# Patient Record
Sex: FEMALE | Race: WHITE
[De-identification: names, ages, dates, MRNs, and addresses within clinical notes are randomized per-mention and may not be internally consistent; named-entity substitution may affect disease eponyms.]

---

## 2019-11-27 ENCOUNTER — TRANSCRIPTION ENCOUNTER (OUTPATIENT)
Age: 74
End: 2019-11-27

## 2021-04-09 ENCOUNTER — OUTPATIENT (OUTPATIENT)
Dept: OUTPATIENT SERVICES | Facility: HOSPITAL | Age: 76
LOS: 1 days | End: 2021-04-09
Payer: MEDICARE

## 2021-04-09 ENCOUNTER — APPOINTMENT (OUTPATIENT)
Dept: CV DIAGNOSITCS | Facility: HOSPITAL | Age: 76
End: 2021-04-09

## 2021-04-09 DIAGNOSIS — R07.9 CHEST PAIN, UNSPECIFIED: ICD-10-CM

## 2021-04-09 PROCEDURE — 93306 TTE W/DOPPLER COMPLETE: CPT | Mod: 26

## 2022-05-06 ENCOUNTER — APPOINTMENT (OUTPATIENT)
Dept: ORTHOPEDIC SURGERY | Facility: CLINIC | Age: 77
End: 2022-05-06
Payer: MEDICARE

## 2022-05-06 VITALS — WEIGHT: 150 LBS | BODY MASS INDEX: 25.61 KG/M2 | HEIGHT: 64 IN

## 2022-05-06 DIAGNOSIS — E78.00 PURE HYPERCHOLESTEROLEMIA, UNSPECIFIED: ICD-10-CM

## 2022-05-06 DIAGNOSIS — M25.539 PAIN IN UNSPECIFIED WRIST: ICD-10-CM

## 2022-05-06 DIAGNOSIS — I10 ESSENTIAL (PRIMARY) HYPERTENSION: ICD-10-CM

## 2022-05-06 PROCEDURE — 73100 X-RAY EXAM OF WRIST: CPT | Mod: RT

## 2022-05-06 PROCEDURE — L3908: CPT

## 2022-05-06 PROCEDURE — 73562 X-RAY EXAM OF KNEE 3: CPT | Mod: RT

## 2022-05-06 PROCEDURE — 99214 OFFICE O/P EST MOD 30 MIN: CPT

## 2022-05-06 NOTE — IMAGING
[Left] : left wrist [There are no fractures, subluxations or dislocations. No significant abnormalities are seen] : There are no fractures, subluxations or dislocations. No significant abnormalities are seen [Right] : right knee [AP] : anteroposterior [Lateral] : lateral [[place]] : [place] [Components well fixed, in good position] : Components well fixed, in good position [de-identified] : right knee: large anterior hematoma, not IA, abrasion on skin, NVI, only tender at hematoma, 0-120, ligaments stable, full active quad function \par left wrist: tenderness distal radius, NVI, ecchymosis in hand, full motion and function of all digits

## 2022-05-06 NOTE — ASSESSMENT
[FreeTextEntry1] : Mild OA both hips but no significant exam findings. Right TKA 2014 always with pain but good exam and XRs. Left knee medial pain no sig XR findings. PT and NSAIDs and MRI left knee r/o MMT.\par 1/29/19: MRI showing mild OA with LMT. Lateral pain today. Discussed options - she would like to continue PT for now and possible cortisone inj or knee scope if no improvement in the future.\par 2/26 we will try inj today - cont with PT - she understands that still will have the tear and pain may retrun may still need arthroscopy\par 4/2/19: Left knee doing well - hold on scope. Right TKA cont pain and states it is exactly like before surgery but has good function other than some mild crepitus. No hip pain on exam. Discussed lidocaine challenge or Lspine workup but she would like to hold off on any workup at this point.\par 8/20 she has cont pain in the left knee with LMT and OA but I am unsure if scope will help her nasd TKA on rt she is not happy with - we will try another inj today\par 2/4/20: Recurrence of left knee pain. No XR progression - has right TKA that she is still unhappy with - repeat inj left knee today and offered left knee arthroscopy if pain persists.\par 8/31/20: Repeat inj left knee today - discussed possible future left knee scope if pain returns.\par 4/30/21: No change in left knee XR. Has lateral symptoms - MRI 2019 with large LMT. Right TKA still with pain but feels very stable. Left knee inj today, used zilretta.\par 10/29/21: Return of left knee pain - repeat zilretta inj left knee today.\par \par 5/6/22: patient had an acute fall 2 weeks ago and has significant bruising on left wrist and right knee. No abnormalities seen on XR. Wrist treated with cockup wrist splint and advised patient to use ice and warm compresses on knee.

## 2022-05-11 ENCOUNTER — APPOINTMENT (OUTPATIENT)
Dept: ORTHOPEDIC SURGERY | Facility: CLINIC | Age: 77
End: 2022-05-11

## 2022-06-14 ENCOUNTER — APPOINTMENT (OUTPATIENT)
Dept: ORTHOPEDIC SURGERY | Facility: CLINIC | Age: 77
End: 2022-06-14
Payer: MEDICARE

## 2022-06-14 VITALS — WEIGHT: 150 LBS | BODY MASS INDEX: 25.61 KG/M2 | HEIGHT: 64 IN

## 2022-06-14 DIAGNOSIS — S83.242A OTHER TEAR OF MEDIAL MENISCUS, CURRENT INJURY, LEFT KNEE, INITIAL ENCOUNTER: ICD-10-CM

## 2022-06-14 PROCEDURE — 99214 OFFICE O/P EST MOD 30 MIN: CPT

## 2022-06-14 NOTE — ASSESSMENT
[FreeTextEntry1] : Mild OA both hips but no significant exam findings. Right TKA 2014 always with pain but good exam and XRs. Left knee medial pain no sig XR findings. PT and NSAIDs and MRI left knee r/o MMT.\par 1/29/19: MRI showing mild OA with LMT. Lateral pain today. Discussed options - she would like to continue PT for now and possible cortisone inj or knee scope if no improvement in the future.\par 2/26 we will try inj today - cont with PT - she understands that still will have the tear and pain may retrun may still need arthroscopy\par 4/2/19: Left knee doing well - hold on scope. Right TKA cont pain and states it is exactly like before surgery but has good function other than some mild crepitus. No hip pain on exam. Discussed lidocaine challenge or Lspine workup but she would like to hold off on any workup at this point.\par 8/20 she has cont pain in the left knee with LMT and OA but I am unsure if scope will help her nasd TKA on rt she is not happy with - we will try another inj today\par 2/4/20: Recurrence of left knee pain. No XR progression - has right TKA that she is still unhappy with - repeat inj left knee today and offered left knee arthroscopy if pain persists.\par 8/31/20: Repeat inj left knee today - discussed possible future left knee scope if pain returns.\par 4/30/21: No change in left knee XR. Has lateral symptoms - MRI 2019 with large LMT. Right TKA still with pain but feels very stable. Left knee inj today, used zilretta.\par 10/29/21: Return of left knee pain - repeat zilretta inj left knee today.\par 5/6/22: patient had an acute fall 2 weeks ago and has significant bruising on left wrist and right knee. No abnormalities seen on XR. Wrist treated with cockup wrist splint and advised patient to use ice and warm compresses on knee. \par \par 6/14/222: R TKA s/p fall still has some swelling and bruising - reassured her this is normal for 6 weeks and will likely resolve on its own. Left knee LMT has failed forms of conservative treatment and would like to proceed with scope. Will try voltaren gel for now. - review of MRI shows min O Abut there is some - not bad enough for TKA but large tear may do well with Scope - she will NOT have TKA again not happy with rt knee result.  understands will have knee OA After scope and may still need tx and have some pain

## 2022-06-14 NOTE — IMAGING
[de-identified] : right knee: large anterior hematoma, not IA, abrasion on skin, NVI, only tender at hematoma, 0-120, ligaments stable, full active quad function \par Left Knee: Knee Range of Motion is as follows: Extension: 0 degrees. Flexion: 125 degrees. Gait and function is as follows: patient ambulates without assistive device.

## 2022-06-14 NOTE — DISCUSSION/SUMMARY
[de-identified] : The patient demonstrated a full understanding of the surgical and non-surgical options.  The risks of surgery were outlined in full to the patient including but not limited to bleeding, scarring, infection, sepsis, neurologic injury, vascular injury, failure to resolve symptoms, symptom recurrence, the need for further surgery, non-healing, wound breakdown, deep vein thrombosis, pulmonary embolism, spontaneous osteonecrosis, anesthesia complications and even death.  The patient understood all the risks and accepted them and understood that other complications could occur that are not mentioned above.  The intraoperative plan, post-operative plan, post-operative expectations and limitations were explained in full.  Expectations from non-surgical treatment were explained in full as well.  The patient demonstrated a complete understanding of the treatment alternatives and requested the above-mentioned procedure.  This will be scheduled accordingly.\par oa\par \par \par Entered by Lennie Zhao acting as scribe.\par

## 2022-06-14 NOTE — HISTORY OF PRESENT ILLNESS
[5] : 5 [3] : 3 [Dull/Aching] : dull/aching [Rest] : rest [Meds] : meds [de-identified] : 6/14/22: R TKA doing okay s/p fall but still has some swelling and bruising in knee. Left knee is starting to get worse with walking and stairs. Zilretta in left knee has not helped her much\par \par Previous doc:\par Left knee and hip pain x 9 months, no specific injury. No prior left knee or hip issues. Right TKA 2014 by Dr. Byers at [facility] still with pain.\par 1/29/19: F/U MRI left knee.\par 2/26 overall better but still has pain daily with normal activity\par 4/2/19: Left knee doing well since inj. Right TKA cont pain.\par 2/4/20: Return of left knee pain, inj in Aug helped.\par 8/31/20: Inj in Feb helped for a few months, pain returning in left knee lately. Right knee unchanged.\par 4/30/21: Inj helped last summer but pain has been slowly returning.\par 10/29/21: Inj helped but pain has returned.\par 5/6/22: patient fell on right TKA about a week ago and has had increased pain in the knee since. She had severe swelling and got at XR at [facility]. Also fell on left wrist and presents with distal wrist pain and swelling. Has large bruise and scrape on knee as well.

## 2022-10-25 ENCOUNTER — APPOINTMENT (OUTPATIENT)
Dept: ORTHOPEDIC SURGERY | Facility: CLINIC | Age: 77
End: 2022-10-25

## 2022-10-25 VITALS — BODY MASS INDEX: 25.61 KG/M2 | WEIGHT: 150 LBS | HEIGHT: 64 IN

## 2022-10-25 PROCEDURE — 99214 OFFICE O/P EST MOD 30 MIN: CPT | Mod: 25

## 2022-10-25 PROCEDURE — 20610 DRAIN/INJ JOINT/BURSA W/O US: CPT | Mod: LT

## 2022-10-25 RX ORDER — IRBESARTAN 300 MG/1
300 TABLET ORAL
Qty: 90 | Refills: 0 | Status: ACTIVE | COMMUNITY
Start: 2022-09-02

## 2022-10-25 RX ORDER — SIMVASTATIN 20 MG/1
20 TABLET, FILM COATED ORAL
Qty: 90 | Refills: 0 | Status: ACTIVE | COMMUNITY
Start: 2022-08-26

## 2022-10-25 RX ORDER — TRIAMCINOLONE ACETONIDE 1 MG/G
0.1 CREAM TOPICAL
Qty: 30 | Refills: 0 | Status: ACTIVE | COMMUNITY
Start: 2022-05-26

## 2022-10-25 NOTE — PROCEDURE
[Large Joint Injection] : Large joint injection [Left] : of the left [Knee] : knee [Pain] : pain [Inflammation] : inflammation [X-ray evidence of Osteoarthritis on this or prior visit] : x-ray evidence of Osteoarthritis on this or prior visit [Alcohol] : alcohol [Betadine] : betadine [Ethyl Chloride sprayed topically] : ethyl chloride sprayed topically [Sterile technique used] : sterile technique used [___ cc    1%] : Lidocaine ~Vcc of 1%  [Orthovisc] : Orthovisc [#1] : series #1 [] : Patient tolerated procedure well [Call if redness, pain or fever occur] : call if redness, pain or fever occur [Apply ice for 15min out of every hour for the next 12-24 hours as tolerated] : apply ice for 15 minutes out of every hour for the next 12-24 hours as tolerated [Patient was advised to rest the joint(s) for ____ days] : patient was advised to rest the joint(s) for [unfilled] days [Previous OTC use and PT nontherapeutic] : patient has tried OTC's including aspirin, Ibuprofen, Aleve, etc or prescription NSAIDS, and/or exercises at home and/or physical therapy without satisfactory response [Risks, benefits, alternatives discussed / Verbal consent obtained] : the risks benefits, and alternatives have been discussed, and verbal consent was obtained [Prior failure or difficult injection] : prior failure or difficult injection [All ultrasound images have been permanently captured and stored accordingly in our picture archiving and communication system] : All ultrasound images have been permanently captured and stored accordingly in our picture archiving and communication system [Visualization of the needle and placement of injection was performed without complication] : visualization of the needle and placement of injection was performed without complication

## 2022-10-28 NOTE — DISCUSSION/SUMMARY
[de-identified] : The patient was advised of the diagnosis.  The natural history of the pathology was explained in full to the patient in layman's terms. All questions were answered.  The risks and benefits of surgical and non-surgical treatment alternatives were explained in full to the patient.\par \par Progress note completed by Vielka Bolivar PA-C.\par The documentation recorded by the PA accurately reflects the service I personally performed and the decisions made by me. -Dr. Espitia

## 2022-10-28 NOTE — IMAGING
[de-identified] : right knee: NVI 0-120, ligaments stable, \par \par Left Knee: Lateral joint line tenderness. Knee Range of Motion is as follows: Extension: 0 degrees. Flexion: 125 degrees. Gait and function is as follows: patient ambulates without assistive device.

## 2022-10-28 NOTE — HISTORY OF PRESENT ILLNESS
[9] : 9 [7] : 7 [Dull/Aching] : dull/aching [Constant] : constant [Household chores] : household chores [Leisure] : leisure [Meds] : meds [Standing] : standing [Walking] : walking [de-identified] : 10/25/22: Here for follow up bilateral knees. She is hesitant on the L knee scope that had been previously discussed. L knee pain continues. \par \par Previous doc:\par Left knee and hip pain x 9 months, no specific injury. No prior left knee or hip issues. Right TKA 2014 by Dr. Byers at [facility] still with pain.\par 1/29/19: F/U MRI left knee.\par 2/26 overall better but still has pain daily with normal activity\par 4/2/19: Left knee doing well since inj. Right TKA cont pain.\par 2/4/20: Return of left knee pain, inj in Aug helped.\par 8/31/20: Inj in Feb helped for a few months, pain returning in left knee lately. Right knee unchanged.\par 4/30/21: Inj helped last summer but pain has been slowly returning.\par 10/29/21: Inj helped but pain has returned.\par 5/6/22: patient fell on right TKA about a week ago and has had increased pain in the knee since. She had severe swelling and got at XR at [facility]. Also fell on left wrist and presents with distal wrist pain and swelling. Has large bruise and scrape on knee as well.  \par 6/14/22: R TKA doing okay s/p fall but still has some swelling and bruising in knee. Left knee is starting to get worse with walking and stairs. Zilretta in left knee has not helped her much [] : no [FreeTextEntry1] : bilateral knees  [FreeTextEntry5] : Pt is here to follow up on bilateral knees. Pt had a TKA in right knee in 2014. Pt states pain sore, and is unable to kneel on it. Pain is anterior. Pt states left knee pain has not improved since last visit. Pt notes meniscal tear in left. Pt is concerned about left knee, as she feels it's worse than right.

## 2022-10-28 NOTE — ASSESSMENT
[FreeTextEntry1] : Mild OA both hips but no significant exam findings. Right TKA 2014 always with pain but good exam and XRs. Left knee medial pain no sig XR findings. PT and NSAIDs and MRI left knee r/o MMT.\par 1/29/19: MRI showing mild OA with LMT. Lateral pain today. Discussed options - she would like to continue PT for now and possible cortisone inj or knee scope if no improvement in the future.\par 2/26 we will try inj today - cont with PT - she understands that still will have the tear and pain may retrun may still need arthroscopy\par 4/2/19: Left knee doing well - hold on scope. Right TKA cont pain and states it is exactly like before surgery but has good function other than some mild crepitus. No hip pain on exam. Discussed lidocaine challenge or Lspine workup but she would like to hold off on any workup at this point.\par 8/20 she has cont pain in the left knee with LMT and OA but I am unsure if scope will help her nasd TKA on rt she is not happy with - we will try another inj today\par 2/4/20: Recurrence of left knee pain. No XR progression - has right TKA that she is still unhappy with - repeat inj left knee today and offered left knee arthroscopy if pain persists.\par 8/31/20: Repeat inj left knee today - discussed possible future left knee scope if pain returns.\par 4/30/21: No change in left knee XR. Has lateral symptoms - MRI 2019 with large LMT. Right TKA still with pain but feels very stable. Left knee inj today, used zilretta.\par 10/29/21: Return of left knee pain - repeat zilretta inj left knee today.\par 5/6/22: patient had an acute fall 2 weeks ago and has significant bruising on left wrist and right knee. No abnormalities seen on XR. Wrist treated with cockup wrist splint and advised patient to use ice and warm compresses on knee. \par 6/14/222: R TKA s/p fall still has some swelling and bruising - reassured her this is normal for 6 weeks and will likely resolve on its own. Left knee LMT has failed forms of conservative treatment and would like to proceed with scope. Will try voltaren gel for now. - review of MRI shows min O Abut there is some - not bad enough for TKA but large tear may do well with Scope - she will NOT have TKA again not happy with rt knee result.  understands will have knee OA After scope and may still need tx and have some pain  \par \par 10/25/22: She has hesitancy on a L knee scope. Discussed repeat Zilretta vs. visco series. She is well informed and would like to proceed with beginning the Orthovisc series today, tolerated.

## 2022-11-01 ENCOUNTER — APPOINTMENT (OUTPATIENT)
Dept: ORTHOPEDIC SURGERY | Facility: CLINIC | Age: 77
End: 2022-11-01

## 2022-11-01 VITALS — HEIGHT: 64 IN | BODY MASS INDEX: 25.61 KG/M2 | WEIGHT: 150 LBS

## 2022-11-01 PROCEDURE — 20610 DRAIN/INJ JOINT/BURSA W/O US: CPT | Mod: LT

## 2022-11-01 NOTE — HISTORY OF PRESENT ILLNESS
[2] : 2 [Orthovisc] : Orthovisc [de-identified] : 11/1/22: Orthovisc #2 left knee.\par \par Previous doc:\par Left knee and hip pain x 9 months, no specific injury. No prior left knee or hip issues. [facility] still [facility].\par 1/29/19: F/U MRI left knee.\par 2/26 overall better but still has pain daily with normal activity\par 4/2/19: Left knee doing well since inj. Right TKA cont pain.\par 2/4/20: Return of left knee pain, inj in Aug helped.\par 8/31/20: Inj in Feb helped for a few months, pain returning in left knee lately. Right knee unchanged.\par 4/30/21: Inj helped last summer but pain has been slowly returning.\par 10/29/21: Inj helped but pain has returned.\par 5/6/22: patient fell on right TKA about a week ago and has had increased pain in the knee since. She had severe swelling and got at XR at [facility]. Also fell on left wrist and presents with distal wrist pain and swelling. Has large bruise and scrape on knee as well.  \par 6/14/22: R TKA doing okay s/p fall but still has some swelling and bruising in knee. Left knee is starting to get worse with walking and stairs. Zilretta in left knee has not helped her much\par 10/25/22: Here for follow up bilateral knees. She is hesitant on the L knee scope that had been previously discussed. L knee pain continues.  [] : no [FreeTextEntry1] : Left knee [FreeTextEntry5] : JEANNINE HAINES is a 76 year old F here for INJ #2 of Orthovisc for the left knee. Pt states that the injection helped a little with her pain, roughly by 10%.  [de-identified] : 10/25/2022 [de-identified] : Left knee [TWNoteComboBox1] : 10%

## 2022-11-01 NOTE — ASSESSMENT
[FreeTextEntry1] : Previous doc:\par Mild OA both hips but no significant exam findings. Right TKA 2014 always with pain but good exam and XRs. Left knee medial pain no sig XR findings. PT and NSAIDs and MRI left knee r/o MMT.\par 1/29/19: MRI showing mild OA with LMT. Lateral pain today. Discussed options - she would like to continue PT for now and possible cortisone inj or knee scope if no improvement in the future.\par 2/26 we will try inj today - cont with PT - she understands that still will have the tear and pain may retrun may still need arthroscopy\par 4/2/19: Left knee doing well - hold on scope. Right TKA cont pain and states it is exactly like before surgery but has good function other than some mild crepitus. No hip pain on exam. Discussed lidocaine challenge or Lspine workup but she would like to hold off on any workup at this point.\par 8/20 she has cont pain in the left knee with LMT and OA but I am unsure if scope will help her nasd TKA on rt she is not happy with - we will try another inj today\par 2/4/20: Recurrence of left knee pain. No XR progression - has right TKA that she is still unhappy with - repeat inj left knee today and offered left knee arthroscopy if pain persists.\par 8/31/20: Repeat inj left knee today - discussed possible future left knee scope if pain returns.\par 4/30/21: No change in left knee XR. Has lateral symptoms - MRI 2019 with large LMT. Right TKA still with pain but feels very stable. Left knee inj today, used zilretta.\par 10/29/21: Return of left knee pain - repeat zilretta inj left knee today.\par 5/6/22: patient had an acute fall 2 weeks ago and has significant bruising on left wrist and right knee. No abnormalities seen on XR. Wrist treated with cockup wrist splint and advised patient to use ice and warm compresses on knee. \par 6/14/222: R TKA s/p fall still has some swelling and bruising - reassured her this is normal for 6 weeks and will likely resolve on its own. Left knee LMT has failed forms of conservative treatment and would like to proceed with scope. Will try voltaren gel for now. - review of MRI shows min O Abut there is some - not bad enough for TKA but large tear may do well with Scope - she will NOT have TKA again not happy with rt knee result.  understands will have knee OA After scope and may still need tx and have some pain  \par 10/25/22: She has hesitancy on a L knee scope. Discussed repeat Zilretta vs. visco series. She is well informed and would like to proceed with beginning the Orthovisc series today, tolerated. \par \par 11/1/22: Inj tolerated well.

## 2022-11-01 NOTE — PROCEDURE
[Large Joint Injection] : Large joint injection [Left] : of the left [Knee] : knee [Pain] : pain [Inflammation] : inflammation [X-ray evidence of Osteoarthritis on this or prior visit] : x-ray evidence of Osteoarthritis on this or prior visit [Alcohol] : alcohol [Betadine] : betadine [Ethyl Chloride sprayed topically] : ethyl chloride sprayed topically [Sterile technique used] : sterile technique used [___ cc    1%] : Lidocaine ~Vcc of 1%  [Orthovisc] : Orthovisc [#2] : series #2 [] : Patient tolerated procedure well [Call if redness, pain or fever occur] : call if redness, pain or fever occur [Apply ice for 15min out of every hour for the next 12-24 hours as tolerated] : apply ice for 15 minutes out of every hour for the next 12-24 hours as tolerated [Previous OTC use and PT nontherapeutic] : patient has tried OTC's including aspirin, Ibuprofen, Aleve, etc or prescription NSAIDS, and/or exercises at home and/or physical therapy without satisfactory response [Patient had decreased mobility in the joint] : patient had decreased mobility in the joint [Risks, benefits, alternatives discussed / Verbal consent obtained] : the risks benefits, and alternatives have been discussed, and verbal consent was obtained [Prior failure or difficult injection] : prior failure or difficult injection [All ultrasound images have been permanently captured and stored accordingly in our picture archiving and communication system] : All ultrasound images have been permanently captured and stored accordingly in our picture archiving and communication system [Visualization of the needle and placement of injection was performed without complication] : visualization of the needle and placement of injection was performed without complication

## 2022-11-08 ENCOUNTER — APPOINTMENT (OUTPATIENT)
Dept: ORTHOPEDIC SURGERY | Facility: CLINIC | Age: 77
End: 2022-11-08

## 2022-11-08 PROCEDURE — 20610 DRAIN/INJ JOINT/BURSA W/O US: CPT

## 2022-11-08 NOTE — IMAGING
[de-identified] : right knee: NVI 0-120, ligaments stable, \par \par Left Knee: Lateral joint line tenderness. Knee Range of Motion is as follows: Extension: 0 degrees. Flexion: 125 degrees. Gait and function is as follows: patient ambulates without assistive device.

## 2022-11-08 NOTE — PROCEDURE
[Large Joint Injection] : Large joint injection [Left] : of the left [Knee] : knee [Pain] : pain [Inflammation] : inflammation [X-ray evidence of Osteoarthritis on this or prior visit] : x-ray evidence of Osteoarthritis on this or prior visit [Alcohol] : alcohol [Betadine] : betadine [Ethyl Chloride sprayed topically] : ethyl chloride sprayed topically [Sterile technique used] : sterile technique used [___ cc    1%] : Lidocaine ~Vcc of 1%  [Orthovisc] : Orthovisc [#3] : series #3 [Call if redness, pain or fever occur] : call if redness, pain or fever occur [] : Patient tolerated procedure well [Apply ice for 15min out of every hour for the next 12-24 hours as tolerated] : apply ice for 15 minutes out of every hour for the next 12-24 hours as tolerated [Previous OTC use and PT nontherapeutic] : patient has tried OTC's including aspirin, Ibuprofen, Aleve, etc or prescription NSAIDS, and/or exercises at home and/or physical therapy without satisfactory response [Patient had decreased mobility in the joint] : patient had decreased mobility in the joint [Risks, benefits, alternatives discussed / Verbal consent obtained] : the risks benefits, and alternatives have been discussed, and verbal consent was obtained [Prior failure or difficult injection] : prior failure or difficult injection [All ultrasound images have been permanently captured and stored accordingly in our picture archiving and communication system] : All ultrasound images have been permanently captured and stored accordingly in our picture archiving and communication system [Visualization of the needle and placement of injection was performed without complication] : visualization of the needle and placement of injection was performed without complication

## 2022-11-08 NOTE — HISTORY OF PRESENT ILLNESS
[Dull/Aching] : dull/aching [3] : 3 [Orthovisc] : Orthovisc [de-identified] : 11/8/22: Orthovisc #3 left knee.\par \par Previous doc:\par Left knee and hip pain x 9 months, no specific injury. No prior left knee or hip issues. Right TKA 2014 by Dr. Byers at \[facility]\"" still with pain.\par 1/29/19: F/U MRI left knee.\par 2/26 overall better but still has pain daily with normal activity\par 4/2/19: Left knee doing well since inj. Right TKA cont pain.\par 2/4/20: Return of left knee pain, inj in Aug helped.\par 8/31/20: Inj in Feb helped for a few months, pain returning in left knee lately. Right knee unchanged.\par 4/30/21: Inj helped last summer but pain has been slowly returning.\par 10/29/21: Inj helped but pain has returned.\par 5/6/22: patient fell on right TKA about a week ago and has had increased pain in the knee since. She had severe swelling and got at XR at [facility]. Also fell on left wrist and presents with distal wrist pain and swelling. Has large bruise and scrape on knee as well.  \par 6/14/22: R TKA doing okay s/p fall but still has some swelling and bruising in knee. Left knee is starting to get worse with walking and stairs. Zilretta in left knee has not helped her much\par 10/25/22: Here for follow up bilateral knees. She is hesitant on the L knee scope that had been previously discussed. L knee pain continues. \par 11/1/22: Orthovisc #2 left knee. [] : no [FreeTextEntry1] : left knee  [FreeTextEntry5] : JEANNINE is here today for injection #3 of orthovisc into left knee. pt states no change in pain.  [de-identified] : 11/01/22 [de-identified] : left knee

## 2022-11-08 NOTE — ASSESSMENT
[FreeTextEntry1] : Previous doc:\par Mild OA both hips but no significant exam findings. Right TKA 2014 always with pain but good exam and XRs. Left knee medial pain no sig XR findings. PT and NSAIDs and MRI left knee r/o MMT.\par 1/29/19: MRI showing mild OA with LMT. Lateral pain today. Discussed options - she would like to continue PT for now and possible cortisone inj or knee scope if no improvement in the future.\par 2/26 we will try inj today - cont with PT - she understands that still will have the tear and pain may retrun may still need arthroscopy\par 4/2/19: Left knee doing well - hold on scope. Right TKA cont pain and states it is exactly like before surgery but has good function other than some mild crepitus. No hip pain on exam. Discussed lidocaine challenge or Lspine workup but she would like to hold off on any workup at this point.\par 8/20 she has cont pain in the left knee with LMT and OA but I am unsure if scope will help her nasd TKA on rt she is not happy with - we will try another inj today\par 2/4/20: Recurrence of left knee pain. No XR progression - has right TKA that she is still unhappy with - repeat inj left knee today and offered left knee arthroscopy if pain persists.\par 8/31/20: Repeat inj left knee today - discussed possible future left knee scope if pain returns.\par 4/30/21: No change in left knee XR. Has lateral symptoms - MRI 2019 with large LMT. Right TKA still with pain but feels very stable. Left knee inj today, used zilretta.\par 10/29/21: Return of left knee pain - repeat zilretta inj left knee today.\par 5/6/22: patient had an acute fall 2 weeks ago and has significant bruising on left wrist and right knee. No abnormalities seen on XR. Wrist treated with cockup wrist splint and advised patient to use ice and warm compresses on knee. \par 6/14/222: R TKA s/p fall still has some swelling and bruising - reassured her this is normal for 6 weeks and will likely resolve on its own. Left knee LMT has failed forms of conservative treatment and would like to proceed with scope. Will try voltaren gel for now. - review of MRI shows min O Abut there is some - not bad enough for TKA but large tear may do well with Scope - she will NOT have TKA again not happy with rt knee result.  understands will have knee OA After scope and may still need tx and have some pain  \par 10/25/22: She has hesitancy on a L knee scope. Discussed repeat Zilretta vs. visco series. She is well informed and would like to proceed with beginning the Orthovisc series today, tolerated. \par 11/1/22: Inj tolerated well.\par \par 11/8/22: Inj tolerated well.

## 2022-11-15 ENCOUNTER — APPOINTMENT (OUTPATIENT)
Dept: ORTHOPEDIC SURGERY | Facility: CLINIC | Age: 77
End: 2022-11-15

## 2022-11-15 VITALS — BODY MASS INDEX: 25.61 KG/M2 | HEIGHT: 64 IN | WEIGHT: 150 LBS

## 2022-11-15 PROCEDURE — 20610 DRAIN/INJ JOINT/BURSA W/O US: CPT

## 2022-11-15 NOTE — IMAGING
[de-identified] : right knee: NVI 0-120, ligaments stable, \par \par Left Knee: Lateral joint line tenderness. Knee Range of Motion is as follows: Extension: 0 degrees. Flexion: 125 degrees. Gait and function is as follows: patient ambulates without assistive device.

## 2022-11-15 NOTE — HISTORY OF PRESENT ILLNESS
[7] : 7 [3] : 3 [4] : 4 [Orthovisc] : Orthovisc [de-identified] : 11/15/22: Orthovisc #4 left knee.\par \par Previous doc:\par Left knee and hip pain x 9 months, no specific injury. No prior left knee or hip issues. Right TKA 2014 by Dr. Byers at [facility] still with pain.\par 1/29/19: F/U MRI left knee.\par 2/26 overall better but still has pain daily with normal activity\par 4/2/19: Left knee doing well since inj. Right TKA cont pain.\par 2/4/20: Return of left knee pain, inj in Aug helped.\par 8/31/20: Inj in Feb helped for a few months, pain returning in left knee lately. Right knee unchanged.\par 4/30/21: Inj helped last summer but pain has been slowly returning.\par 10/29/21: Inj helped but pain has returned.\par 5/6/22: patient fell on right TKA about a week ago and has had increased pain in the knee since. She had severe swelling and got at XR at [facility]. Also fell on left wrist and presents with distal wrist pain and swelling. Has large bruise and scrape on knee as well.  \par 6/14/22: R TKA doing okay s/p fall but still has some swelling and bruising in knee. Left knee is starting to get worse with walking and stairs. Zilretta in left knee has not helped her much\par 10/25/22: Here for follow up bilateral knees. She is hesitant on the L knee scope that had been previously discussed. L knee pain continues. \par 11/1/22: Orthovisc #2 left knee.\par 11/8/22: Orthovisc #3 left knee. [] : no [FreeTextEntry1] : Left knee [FreeTextEntry5] : JEANNINE HAINES is a 76 year old F here for INJ #4 of Orthovisc for left knee pain. [de-identified] : 11/8/22 [de-identified] : Left knee [TWNoteComboBox1] : 10%

## 2022-11-15 NOTE — ASSESSMENT
[FreeTextEntry1] : Previous doc:\par Mild OA both hips but no significant exam findings. Right TKA 2014 always with pain but good exam and XRs. Left knee medial pain no sig XR findings. PT and NSAIDs and MRI left knee r/o MMT.\par 1/29/19: MRI showing mild OA with LMT. Lateral pain today. Discussed options - she would like to continue PT for now and possible cortisone inj or knee scope if no improvement in the future.\par 2/26 we will try inj today - cont with PT - she understands that still will have the tear and pain may retrun may still need arthroscopy\par 4/2/19: Left knee doing well - hold on scope. Right TKA cont pain and states it is exactly like before surgery but has good function other than some mild crepitus. No hip pain on exam. Discussed lidocaine challenge or Lspine workup but she would like to hold off on any workup at this point.\par 8/20 she has cont pain in the left knee with LMT and OA but I am unsure if scope will help her nasd TKA on rt she is not happy with - we will try another inj today\par 2/4/20: Recurrence of left knee pain. No XR progression - has right TKA that she is still unhappy with - repeat inj left knee today and offered left knee arthroscopy if pain persists.\par 8/31/20: Repeat inj left knee today - discussed possible future left knee scope if pain returns.\par 4/30/21: No change in left knee XR. Has lateral symptoms - MRI 2019 with large LMT. Right TKA still with pain but feels very stable. Left knee inj today, used zilretta.\par 10/29/21: Return of left knee pain - repeat zilretta inj left knee today.\par 5/6/22: patient had an acute fall 2 weeks ago and has significant bruising on left wrist and right knee. No abnormalities seen on XR. Wrist treated with cockup wrist splint and advised patient to use ice and warm compresses on knee. \par 6/14/222: R TKA s/p fall still has some swelling and bruising - reassured her this is normal for 6 weeks and will likely resolve on its own. Left knee LMT has failed forms of conservative treatment and would like to proceed with scope. Will try voltaren gel for now. - review of MRI shows min O Abut there is some - not bad enough for TKA but large tear may do well with Scope - she will NOT have TKA again not happy with rt knee result.  understands will have knee OA After scope and may still need tx and have some pain  \par 10/25/22: She has hesitancy on a L knee scope. Discussed repeat Zilretta vs. visco series. She is well informed and would like to proceed with beginning the Orthovisc series today, tolerated. \par 11/1/22: Inj tolerated well.\par 11/8/22: Inj tolerated well.\par \par 11/15/22: Inj tolerated well.

## 2022-11-15 NOTE — PROCEDURE
[Large Joint Injection] : Large joint injection [Left] : of the left [Knee] : knee [Pain] : pain [Inflammation] : inflammation [X-ray evidence of Osteoarthritis on this or prior visit] : x-ray evidence of Osteoarthritis on this or prior visit [Alcohol] : alcohol [Betadine] : betadine [Ethyl Chloride sprayed topically] : ethyl chloride sprayed topically [Sterile technique used] : sterile technique used [___ cc    1%] : Lidocaine ~Vcc of 1%  [Orthovisc] : Orthovisc [#4] : series #4 [] : Patient tolerated procedure well [Call if redness, pain or fever occur] : call if redness, pain or fever occur [Apply ice for 15min out of every hour for the next 12-24 hours as tolerated] : apply ice for 15 minutes out of every hour for the next 12-24 hours as tolerated [Previous OTC use and PT nontherapeutic] : patient has tried OTC's including aspirin, Ibuprofen, Aleve, etc or prescription NSAIDS, and/or exercises at home and/or physical therapy without satisfactory response [Patient had decreased mobility in the joint] : patient had decreased mobility in the joint [Risks, benefits, alternatives discussed / Verbal consent obtained] : the risks benefits, and alternatives have been discussed, and verbal consent was obtained [Prior failure or difficult injection] : prior failure or difficult injection [All ultrasound images have been permanently captured and stored accordingly in our picture archiving and communication system] : All ultrasound images have been permanently captured and stored accordingly in our picture archiving and communication system [Visualization of the needle and placement of injection was performed without complication] : visualization of the needle and placement of injection was performed without complication

## 2023-02-13 ENCOUNTER — APPOINTMENT (OUTPATIENT)
Dept: ORTHOPEDIC SURGERY | Facility: CLINIC | Age: 78
End: 2023-02-13
Payer: MEDICARE

## 2023-02-13 VITALS — BODY MASS INDEX: 26.12 KG/M2 | WEIGHT: 153 LBS | HEIGHT: 64 IN

## 2023-02-13 PROCEDURE — 99214 OFFICE O/P EST MOD 30 MIN: CPT

## 2023-02-13 PROCEDURE — 73562 X-RAY EXAM OF KNEE 3: CPT | Mod: LT

## 2023-02-13 NOTE — ASSESSMENT
[FreeTextEntry1] : Previous doc:\par Mild OA both hips but no significant exam findings. Right TKA 2014 always with pain but good exam and XRs. Left knee medial pain no sig XR findings. PT and NSAIDs and MRI left knee r/o MMT.\par 1/29/19: MRI showing mild OA with LMT. Lateral pain today. Discussed options - she would like to continue PT for now and possible cortisone inj or knee scope if no improvement in the future.\par 2/26 we will try inj today - cont with PT - she understands that still will have the tear and pain may retrun may still need arthroscopy\par 4/2/19: Left knee doing well - hold on scope. Right TKA cont pain and states it is exactly like before surgery but has good function other than some mild crepitus. No hip pain on exam. Discussed lidocaine challenge or Lspine workup but she would like to hold off on any workup at this point.\par 8/20 she has cont pain in the left knee with LMT and OA but I am unsure if scope will help her nasd TKA on rt she is not happy with - we will try another inj today\par 2/4/20: Recurrence of left knee pain. No XR progression - has right TKA that she is still unhappy with - repeat inj left knee today and offered left knee arthroscopy if pain persists.\par 8/31/20: Repeat inj left knee today - discussed possible future left knee scope if pain returns.\par 4/30/21: No change in left knee XR. Has lateral symptoms - MRI 2019 with large LMT. Right TKA still with pain but feels very stable. Left knee inj today, used zilretta.\par 10/29/21: Return of left knee pain - repeat zilretta inj left knee today.\par 5/6/22: patient had an acute fall 2 weeks ago and has significant bruising on left wrist and right knee. No abnormalities seen on XR. Wrist treated with cockup wrist splint and advised patient to use ice and warm compresses on knee. \par 6/14/222: R TKA s/p fall still has some swelling and bruising - reassured her this is normal for 6 weeks and will likely resolve on its own. Left knee LMT has failed forms of conservative treatment and would like to proceed with scope. Will try voltaren gel for now. - review of MRI shows min O Abut there is some - not bad enough for TKA but large tear may do well with Scope - she will NOT have TKA again not happy with rt knee result.  understands will have knee OA After scope and may still need tx and have some pain  \par 10/25/22: She has hesitancy on a L knee scope. Discussed repeat Zilretta vs. visco series. She is well informed and would like to proceed with beginning the Orthovisc series today, tolerated. \par 11/1/22: Inj tolerated well.\par 11/8/22: Inj tolerated well.\par 11/15/22: Inj tolerated well.\par \par 2/13/23: Cont left knee pain - no relief from HA injections, XRs unchanged - she would like to proceed with left knee arthroscopy.  Again discussed that she has some OA and may need some treatments for this in the future including repeat injections - she does not want TKA and would like to try scope to alleviate mechanical symptoms.

## 2023-02-13 NOTE — HISTORY OF PRESENT ILLNESS
[6] : 6 [3] : 3 [Dull/Aching] : dull/aching [Intermittent] : intermittent [Household chores] : household chores [Leisure] : leisure [Orthovisc] : Orthovisc [de-identified] : 2/13/23: No relief from HA injections.  Cont lateral left knee pain.\par \par Previous doc:\par Left knee and hip pain x 9 months, no specific injury. No prior left knee or hip issues. Right TKA 2014 by Dr. Byers at [facility] still with pain.\par 1/29/19: F/U MRI left knee.\par 2/26 overall better but still has pain daily with normal activity\par 4/2/19: Left knee doing well since inj. Right TKA cont pain.\par 2/4/20: Return of left knee pain, inj in Aug helped.\par 8/31/20: Inj in Feb helped for a few months, pain returning in left knee lately. Right knee unchanged.\par 4/30/21: Inj helped last summer but pain has been slowly returning.\par 10/29/21: Inj helped but pain has returned.\par 5/6/22: patient fell on right TKA about a week ago and has had increased pain in the knee since. She had severe swelling and got at XR at [facility]. Also fell on left wrist and presents with distal wrist pain and swelling. Has large bruise and scrape on knee as well.  \par 6/14/22: R TKA doing okay s/p fall but still has some swelling and bruising in knee. Left knee is starting to get worse with walking and stairs. Zilretta in left knee has not helped her much\par 10/25/22: Here for follow up bilateral knees. She is hesitant on the L knee scope that had been previously discussed. L knee pain continues. \par 11/1/22: Orthovisc #2 left knee.\par 11/8/22: Orthovisc #3 left knee.\par 11/15/22: Orthovisc #4 left knee. [] : Post Surgical Visit: no [FreeTextEntry1] : LT Hip [FreeTextEntry5] : Pt is here for follow up of left hip/left knee. Pain has increased since the last visit with no specific injury.  [FreeTextEntry7] : hip to knee [de-identified] : orthovisc injections

## 2023-02-13 NOTE — DISCUSSION/SUMMARY
[de-identified] : The patient demonstrated a full understanding of the surgical and non-surgical options.  The risks of surgery were outlined in full to the patient including but not limited to bleeding, scarring, infection, sepsis, neurologic injury, vascular injury, failure to resolve symptoms, symptom recurrence, the need for further surgery, non-healing, wound breakdown, deep vein thrombosis, pulmonary embolism, spontaneous osteonecrosis, anesthesia complications and even death.  The patient understood all the risks and accepted them and understood that other complications could occur that are not mentioned above.  The intraoperative plan, post-operative plan, post-operative expectations and limitations were explained in full.  Expectations from non-surgical treatment were explained in full as well.  The patient demonstrated a complete understanding of the treatment alternatives and requested the above-mentioned procedure.  This will be scheduled accordingly.\par \par The patient has two pathologic conditions at this point.  There is a meniscus tear which is causing symptomology and there is also underlying osteoarthritis.  The patient was counseled that surgical intervention to address the torn meniscus will not change the symptomology attributable to the arthritis.  The patient was advised that the pain attributable to the meniscus tear should be resolved arthroscopically.  However, the patient should expect to have continued aches and pains related to the arthritis, in the form of, but not limited to pain, weather related changes, dull ache, crepitation, limitation of motion and strength and the need for further surgeries. The patient understands that the arthroscopic procedure addresses the meniscus only and that after surgery, the knee will not be 100% but it should be improved with respect to the symptomology attributed to the torn meniscus.  Patient also is aware that further treatment after arthroscopy may be necessary in the form of oral medications, cortisone and/or viscosupplementation injections, and further surgeries including knee replacement.  The patient agrees, understands and wishes to proceed.\par

## 2023-02-13 NOTE — IMAGING
[Left] : left knee [AP] : anteroposterior [Lateral] : lateral [[place]] : [place] [Mild tricompartmental OA medial narrowing] : Mild tricompartmental OA medial narrowing [de-identified] : right knee: NVI 0-120, ligaments stable, \par \par Left Knee: Lateral joint line tenderness. Knee Range of Motion is as follows: Extension: 0 degrees. Flexion: 125 degrees. Gait and function is as follows: patient ambulates without assistive device.

## 2023-04-19 ENCOUNTER — APPOINTMENT (OUTPATIENT)
Dept: ORTHOPEDIC SURGERY | Facility: HOSPITAL | Age: 78
End: 2023-04-19

## 2023-04-20 ENCOUNTER — APPOINTMENT (OUTPATIENT)
Dept: ORTHOPEDIC SURGERY | Facility: CLINIC | Age: 78
End: 2023-04-20
Payer: MEDICARE

## 2023-04-20 VITALS — BODY MASS INDEX: 26.12 KG/M2 | HEIGHT: 64 IN | WEIGHT: 153 LBS

## 2023-04-20 DIAGNOSIS — I89.0 LYMPHEDEMA, NOT ELSEWHERE CLASSIFIED: ICD-10-CM

## 2023-04-20 DIAGNOSIS — Z98.890 OTHER SPECIFIED POSTPROCEDURAL STATES: ICD-10-CM

## 2023-04-20 PROCEDURE — 99213 OFFICE O/P EST LOW 20 MIN: CPT

## 2023-04-20 PROCEDURE — 73610 X-RAY EXAM OF ANKLE: CPT | Mod: LT
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
## 2023-04-20 NOTE — IMAGING
[Left] : left ankle [Weight -] : weightbearing [No loss of surgical correlation. Bony alignment acceptable. Hardware in appropriate position] : No loss of surgical correlation. Bony alignment acceptable. Hardware in appropriate position

## 2023-04-20 NOTE — DISCUSSION/SUMMARY
[de-identified] : would recommend elevation and compression stockings\par cardio/PCP follow-up discussed\par HEP\par f/u prn

## 2023-04-20 NOTE — PHYSICAL EXAM
[Left] : left foot and ankle [NL (40)] : plantar flexion 40 degrees [5___] : plantar flexion 5[unfilled]/5 [2+] : dorsalis pedis pulse: 2+ [] : no achilles tendon insertion tenderness [FreeTextEntry3] : 1+ pitting edema symmetrical [TWNoteComboBox7] : dorsiflexion 15 degrees

## 2023-04-25 ENCOUNTER — APPOINTMENT (OUTPATIENT)
Dept: ORTHOPEDIC SURGERY | Facility: CLINIC | Age: 78
End: 2023-04-25

## 2023-05-01 ENCOUNTER — APPOINTMENT (OUTPATIENT)
Dept: ULTRASOUND IMAGING | Facility: CLINIC | Age: 78
End: 2023-05-01

## 2023-05-03 ENCOUNTER — APPOINTMENT (OUTPATIENT)
Dept: ULTRASOUND IMAGING | Facility: CLINIC | Age: 78
End: 2023-05-03
Payer: MEDICARE

## 2023-05-03 ENCOUNTER — OUTPATIENT (OUTPATIENT)
Dept: OUTPATIENT SERVICES | Facility: HOSPITAL | Age: 78
LOS: 1 days | End: 2023-05-03
Payer: MEDICARE

## 2023-05-03 DIAGNOSIS — I82.409 ACUTE EMBOLISM AND THROMBOSIS OF UNSPECIFIED DEEP VEINS OF UNSPECIFIED LOWER EXTREMITY: ICD-10-CM

## 2023-05-03 PROCEDURE — 93970 EXTREMITY STUDY: CPT

## 2023-05-03 PROCEDURE — 93970 EXTREMITY STUDY: CPT | Mod: 26

## 2023-07-21 ENCOUNTER — APPOINTMENT (OUTPATIENT)
Dept: ORTHOPEDIC SURGERY | Facility: CLINIC | Age: 78
End: 2023-07-21
Payer: MEDICARE

## 2023-07-21 VITALS — HEIGHT: 64 IN | WEIGHT: 150 LBS | BODY MASS INDEX: 25.61 KG/M2

## 2023-07-21 DIAGNOSIS — M70.62 TROCHANTERIC BURSITIS, LEFT HIP: ICD-10-CM

## 2023-07-21 PROCEDURE — 73503 X-RAY EXAM HIP UNI 4/> VIEWS: CPT | Mod: LT

## 2023-07-21 PROCEDURE — J3490M: CUSTOM | Mod: NC

## 2023-07-21 PROCEDURE — 99214 OFFICE O/P EST MOD 30 MIN: CPT | Mod: 25

## 2023-07-21 PROCEDURE — 20611 DRAIN/INJ JOINT/BURSA W/US: CPT | Mod: LT

## 2023-07-21 NOTE — ASSESSMENT
[FreeTextEntry1] : Previous doc:\par Mild OA both hips but no significant exam findings. Right TKA 2014 always with pain but good exam and XRs. Left knee medial pain no sig XR findings. PT and NSAIDs and MRI left knee r/o MMT.\par 1/29/19: MRI showing mild OA with LMT. Lateral pain today. Discussed options - she would like to continue PT for now and possible cortisone inj or knee scope if no improvement in the future.\par 2/26 we will try inj today - cont with PT - she understands that still will have the tear and pain may retrun may still need arthroscopy\par 4/2/19: Left knee doing well - hold on scope. Right TKA cont pain and states it is exactly like before surgery but has good function other than some mild crepitus. No hip pain on exam. Discussed lidocaine challenge or Lspine workup but she would like to hold off on any workup at this point.\par 8/20 she has cont pain in the left knee with LMT and OA but I am unsure if scope will help her nasd TKA on rt she is not happy with - we will try another inj today\par 2/4/20: Recurrence of left knee pain. No XR progression - has right TKA that she is still unhappy with - repeat inj left knee today and offered left knee arthroscopy if pain persists.\par 8/31/20: Repeat inj left knee today - discussed possible future left knee scope if pain returns.\par 4/30/21: No change in left knee XR. Has lateral symptoms - MRI 2019 with large LMT. Right TKA still with pain but feels very stable. Left knee inj today, used zilretta.\par 10/29/21: Return of left knee pain - repeat zilretta inj left knee today.\par 5/6/22: patient had an acute fall 2 weeks ago and has significant bruising on left wrist and right knee. No abnormalities seen on XR. Wrist treated with cockup wrist splint and advised patient to use ice and warm compresses on knee. \par 6/14/222: R TKA s/p fall still has some swelling and bruising - reassured her this is normal for 6 weeks and will likely resolve on its own. Left knee LMT has failed forms of conservative treatment and would like to proceed with scope. Will try voltaren gel for now. - review of MRI shows min O Abut there is some - not bad enough for TKA but large tear may do well with Scope - she will NOT have TKA again not happy with rt knee result.  understands will have knee OA After scope and may still need tx and have some pain  \par 10/25/22: She has hesitancy on a L knee scope. Discussed repeat Zilretta vs. visco series. She is well informed and would like to proceed with beginning the Orthovisc series today, tolerated. \par 11/1/22: Inj tolerated well.\par 11/8/22: Inj tolerated well.\par 11/15/22: Inj tolerated well.\par 2/13/23: Cont left knee pain - no relief from HA injections, XRs unchanged - she would like to proceed with left knee arthroscopy.  Again discussed that she has some OA and may need some treatments for this in the future including repeat injections - she does not want TKA and would like to try scope to alleviate mechanical symptoms.\par \par 7/21/23: Left hip troch bursitis, continued L knee pain.  Discussed anti-inflammatories, MDP, PT/HEP, and bursa CSI. She would like to plan for L knee arthroscopy after Summer 2023. L hip bursa CSI today, tolerated well.

## 2023-07-21 NOTE — IMAGING
[Left] : left hip with pelvis [There are no fractures, subluxations or dislocations. No significant abnormalities are seen] : There are no fractures, subluxations or dislocations. No significant abnormalities are seen [Mild arthritis (Tonnis Grade 1)] : Mild arthritis (Tonnis Grade 1) [de-identified] : right knee: NVI 0-120, ligaments stable, \par \par Left Knee: Lateral joint line tenderness. Knee Range of Motion is as follows: Extension: 0 degrees. Flexion: 125 degrees. Gait and function is as follows: patient ambulates without assistive device. \par \par LEFT HIP\par (-) Groin tenderness\par + Greater troch bursa tenderness\par (-) Buttock tenderness \par Pain in bursa with external rotation\par NVI\par Non-antalgic gait w/o assistance

## 2023-07-21 NOTE — DISCUSSION/SUMMARY
[de-identified] : The patient demonstrated a full understanding of the surgical and non-surgical options.  The risks of surgery were outlined in full to the patient including but not limited to bleeding, scarring, infection, sepsis, neurologic injury, vascular injury, failure to resolve symptoms, symptom recurrence, the need for further surgery, non-healing, wound breakdown, deep vein thrombosis, pulmonary embolism, spontaneous osteonecrosis, anesthesia complications and even death.  The patient understood all the risks and accepted them and understood that other complications could occur that are not mentioned above.  The intraoperative plan, post-operative plan, post-operative expectations and limitations were explained in full.  Expectations from non-surgical treatment were explained in full as well.  The patient demonstrated a complete understanding of the treatment alternatives and requested the above-mentioned procedure.  This will be scheduled accordingly.\par \par The patient has two pathologic conditions at this point.  There is a meniscus tear which is causing symptomology and there is also underlying osteoarthritis.  The patient was counseled that surgical intervention to address the torn meniscus will not change the symptomology attributable to the arthritis.  The patient was advised that the pain attributable to the meniscus tear should be resolved arthroscopically.  However, the patient should expect to have continued aches and pains related to the arthritis, in the form of, but not limited to pain, weather related changes, dull ache, crepitation, limitation of motion and strength and the need for further surgeries. The patient understands that the arthroscopic procedure addresses the meniscus only and that after surgery, the knee will not be 100% but it should be improved with respect to the symptomology attributed to the torn meniscus.  Patient also is aware that further treatment after arthroscopy may be necessary in the form of oral medications, cortisone and/or viscosupplementation injections, and further surgeries including knee replacement.  The patient agrees, understands and wishes to proceed.\par \par Progress note completed by Vielka Bolivar PA-C.

## 2023-07-21 NOTE — PROCEDURE
[Large Joint Injection] : Large joint injection [Left] : of the left [Greater Trochanteric Bursa] : greater trochanteric bursa [___ cc    3mg] :  Betamethasone (Celestone) ~Vcc of 3mg [___ cc    1%] : Lidocaine ~Vcc of 1%  [___ cc    0.25%] : Bupivacaine (Marcaine) ~Vcc of 0.25%  [] : Patient tolerated procedure well [Call if redness, pain or fever occur] : call if redness, pain or fever occur [Apply ice for 15min out of every hour for the next 12-24 hours as tolerated] : apply ice for 15 minutes out of every hour for the next 12-24 hours as tolerated [Patient was advised to rest the joint(s) for ____ days] : patient was advised to rest the joint(s) for [unfilled] days [Prior failure or difficult injection] : prior failure or difficult injection [All ultrasound images have been permanently captured and stored accordingly in our picture archiving and communication system] : All ultrasound images have been permanently captured and stored accordingly in our picture archiving and communication system [Visualization of the needle and placement of injection was performed without complication] : visualization of the needle and placement of injection was performed without complication

## 2023-07-21 NOTE — HISTORY OF PRESENT ILLNESS
[Dull/Aching] : dull/aching [de-identified] : 7/21/23: Here for follow up left knee and new onset of left hip pain over that has been worsening over the past few months. \par \par Previous doc:\par Left knee and hip pain x 9 months, no specific injury. No prior left knee or hip issues. Right TKA 2014 by Dr. Byers at [facility] still with pain.\par 1/29/19: F/U MRI left knee.\par 2/26 overall better but still has pain daily with normal activity\par 4/2/19: Left knee doing well since inj. Right TKA cont pain.\par 2/4/20: Return of left knee pain, inj in Aug helped.\par 8/31/20: Inj in Feb helped for a few months, pain returning in left knee lately. Right knee unchanged.\par 4/30/21: Inj helped last summer but pain has been slowly returning.\par 10/29/21: Inj helped but pain has returned.\par 5/6/22: patient fell on right TKA about a week ago and has had increased pain in the knee since. She had severe swelling and got at XR at [facility]. Also fell on left wrist and presents with distal wrist pain and swelling. Has large bruise and scrape on knee as well.  \par 6/14/22: R TKA doing okay s/p fall but still has some swelling and bruising in knee. Left knee is starting to get worse with walking and stairs. Zilretta in left knee has not helped her much\par 10/25/22: Here for follow up bilateral knees. She is hesitant on the L knee scope that had been previously discussed. L knee pain continues. \par 11/1/22: Orthovisc #2 left knee.\par 11/8/22: Orthovisc #3 left knee.\par 11/15/22: Orthovisc #4 left knee.\par 2/13/23: No relief from HA injections.  Cont lateral left knee pain. [FreeTextEntry5] : pain has gotten worse

## 2023-09-19 ENCOUNTER — APPOINTMENT (OUTPATIENT)
Dept: ORTHOPEDIC SURGERY | Facility: CLINIC | Age: 78
End: 2023-09-19
Payer: MEDICARE

## 2023-09-19 VITALS — BODY MASS INDEX: 25.61 KG/M2 | HEIGHT: 64 IN | WEIGHT: 150 LBS

## 2023-09-19 PROCEDURE — 99214 OFFICE O/P EST MOD 30 MIN: CPT | Mod: 25

## 2023-09-19 PROCEDURE — 20611 DRAIN/INJ JOINT/BURSA W/US: CPT | Mod: LT

## 2023-09-19 PROCEDURE — J3490M: CUSTOM | Mod: NC

## 2023-12-05 ENCOUNTER — APPOINTMENT (OUTPATIENT)
Dept: ORTHOPEDIC SURGERY | Facility: CLINIC | Age: 78
End: 2023-12-05
Payer: MEDICARE

## 2023-12-05 VITALS — WEIGHT: 150 LBS | BODY MASS INDEX: 25.61 KG/M2 | HEIGHT: 64 IN

## 2023-12-05 DIAGNOSIS — Z96.651 PRESENCE OF RIGHT ARTIFICIAL KNEE JOINT: ICD-10-CM

## 2023-12-05 DIAGNOSIS — S83.272A COMPLEX TEAR OF LATERAL MENISCUS, CURRENT INJURY, LEFT KNEE, INITIAL ENCOUNTER: ICD-10-CM

## 2023-12-05 PROCEDURE — 20610 DRAIN/INJ JOINT/BURSA W/O US: CPT | Mod: LT

## 2023-12-05 PROCEDURE — 99214 OFFICE O/P EST MOD 30 MIN: CPT | Mod: 25

## 2024-04-22 ENCOUNTER — APPOINTMENT (OUTPATIENT)
Dept: ORTHOPEDIC SURGERY | Facility: CLINIC | Age: 79
End: 2024-04-22
Payer: MEDICARE

## 2024-04-22 VITALS — WEIGHT: 150 LBS | BODY MASS INDEX: 25.61 KG/M2 | HEIGHT: 64 IN

## 2024-04-22 DIAGNOSIS — M17.12 UNILATERAL PRIMARY OSTEOARTHRITIS, LEFT KNEE: ICD-10-CM

## 2024-04-22 PROCEDURE — 73562 X-RAY EXAM OF KNEE 3: CPT | Mod: LT

## 2024-04-22 PROCEDURE — 20610 DRAIN/INJ JOINT/BURSA W/O US: CPT | Mod: LT

## 2024-04-22 PROCEDURE — 99214 OFFICE O/P EST MOD 30 MIN: CPT | Mod: 25

## 2024-04-22 NOTE — DISCUSSION/SUMMARY
[de-identified] : The patient was advised of the diagnosis.  The natural history of the pathology was explained in full to the patient in layman's terms. All questions were answered.  The risks and benefits of surgical and non-surgical treatment alternatives were explained in full to the patient.  The risks, benefits, contents and alternatives to injection were explained in full to the patient.  Risks outlined include but are not limited to infection, sepsis, bleeding, scarring, skin discoloration, temporary increase in pain, syncopal episode, failure to resolve symptoms, allergic reaction, flare reaction, permanent white skin discoloration, symptom recurrence, and elevation of blood sugar in diabetics.  Patient understood the risks.  All questions were answered.  After discussion of options, patient requested an injection.  Oral informed consent was obtained and sterile prep was done of the injection site.  Sterile technique was used to introduce the mixture.  Patient tolerated the procedure well.  Patient advised to ice the injection site this evening.  Signs and symptoms of infection reviewed and patient advised to call immediately for redness, fevers, and/or chills.  Entered by Arlene Raymundo acting as a scribe.

## 2024-04-22 NOTE — HISTORY OF PRESENT ILLNESS
[5] : 5 [9] : 9 [Sharp] : sharp [de-identified] : 4/22/24: LT knee Zilretta at last visit provided good relief for about a month- pain has returned. Takes tylenol as needed with some relief. Most pain at night- occ has sharp pains that wake her up. Ambulates without assistance.   Previous doc: Left knee and hip pain x 9 months, no specific injury. No prior left knee or hip issues. Right TKA 2014 by Dr. Byers at [facility] still with pain. 1/29/19: F/U MRI left knee. 2/26 overall better but still has pain daily with normal activity 4/2/19: Left knee doing well since inj. Right TKA cont pain. 2/4/20: Return of left knee pain, inj in Aug helped. 8/31/20: Inj in Feb helped for a few months, pain returning in left knee lately. Right knee unchanged. 4/30/21: Inj helped last summer but pain has been slowly returning. 10/29/21: Inj helped but pain has returned. 5/6/22: patient fell on right TKA about a week ago and has had increased pain in the knee since. She had severe swelling and got at XR at [facility]. Also fell on left wrist and presents with distal wrist pain and swelling. Has large bruise and scrape on knee as well.   6/14/22: R TKA doing okay s/p fall but still has some swelling and bruising in knee. Left knee is starting to get worse with walking and stairs. Zilretta in left knee has not helped her much 10/25/22: Here for follow up bilateral knees. She is hesitant on the L knee scope that had been previously discussed. L knee pain continues.  11/1/22: Orthovisc #2 left knee. 11/8/22: Orthovisc #3 left knee. 11/15/22: Orthovisc #4 left knee. 2/13/23: No relief from HA injections.  Cont lateral left knee pain. 7/21/23: Here for follow up left knee and new onset of left hip pain over that has been worsening over the past few months.  9/19/23: Here to f/up LT knee. States hip inj at last visit provided good relief. States knee pain comes and goes. Continued lateral joint line pain.  12/5/23: Inj last time helped for about 2 months then pain worsened. [] : no [FreeTextEntry5] : Patient is here for FU on the LT knee. Pain occurs when sleeping.

## 2024-04-22 NOTE — PROCEDURE
[Large Joint Injection] : Large joint injection [Left] : of the left [Knee] : knee [Pain] : pain [Inflammation] : inflammation [X-ray evidence of Osteoarthritis on this or prior visit] : x-ray evidence of Osteoarthritis on this or prior visit [Alcohol] : alcohol [Betadine] : betadine [Ethyl Chloride sprayed topically] : ethyl chloride sprayed topically [Sterile technique used] : sterile technique used [___ cc    32 units 5mg] : Zilretta ~Vcc of 32 units 5 mg  [] : Patient tolerated procedure well [Call if redness, pain or fever occur] : call if redness, pain or fever occur [Previous OTC use and PT nontherapeutic] : patient has tried OTC's including aspirin, Ibuprofen, Aleve, etc or prescription NSAIDS, and/or exercises at home and/or physical therapy without satisfactory response [Patient had decreased mobility in the joint] : patient had decreased mobility in the joint [Risks, benefits, alternatives discussed / Verbal consent obtained] : the risks benefits, and alternatives have been discussed, and verbal consent was obtained [All ultrasound images have been permanently captured and stored accordingly in our picture archiving and communication system] : All ultrasound images have been permanently captured and stored accordingly in our picture archiving and communication system [Visualization of the needle and placement of injection was performed without complication] : visualization of the needle and placement of injection was performed without complication

## 2024-10-15 ENCOUNTER — APPOINTMENT (OUTPATIENT)
Dept: ORTHOPEDIC SURGERY | Facility: CLINIC | Age: 79
End: 2024-10-15
Payer: MEDICARE

## 2024-10-15 VITALS — BODY MASS INDEX: 26.46 KG/M2 | HEIGHT: 64 IN | WEIGHT: 155 LBS

## 2024-10-15 DIAGNOSIS — M17.12 UNILATERAL PRIMARY OSTEOARTHRITIS, LEFT KNEE: ICD-10-CM

## 2024-10-15 PROCEDURE — 99214 OFFICE O/P EST MOD 30 MIN: CPT | Mod: 25

## 2024-10-15 PROCEDURE — 20611 DRAIN/INJ JOINT/BURSA W/US: CPT | Mod: LT

## 2024-10-31 ENCOUNTER — APPOINTMENT (OUTPATIENT)
Dept: ORTHOPEDIC SURGERY | Facility: CLINIC | Age: 79
End: 2024-10-31
Payer: MEDICARE

## 2024-10-31 DIAGNOSIS — Z98.890 OTHER SPECIFIED POSTPROCEDURAL STATES: ICD-10-CM

## 2024-10-31 PROCEDURE — 99213 OFFICE O/P EST LOW 20 MIN: CPT

## 2024-10-31 PROCEDURE — 73610 X-RAY EXAM OF ANKLE: CPT | Mod: LT

## 2024-11-01 ENCOUNTER — APPOINTMENT (OUTPATIENT)
Dept: ORTHOPEDIC SURGERY | Facility: CLINIC | Age: 79
End: 2024-11-01

## 2024-11-11 ENCOUNTER — APPOINTMENT (OUTPATIENT)
Dept: ORTHOPEDIC SURGERY | Facility: CLINIC | Age: 79
End: 2024-11-11
Payer: MEDICARE

## 2024-11-11 VITALS — WEIGHT: 155 LBS | HEIGHT: 64 IN | BODY MASS INDEX: 26.46 KG/M2

## 2024-11-11 DIAGNOSIS — M17.12 UNILATERAL PRIMARY OSTEOARTHRITIS, LEFT KNEE: ICD-10-CM

## 2024-11-11 PROCEDURE — 20610 DRAIN/INJ JOINT/BURSA W/O US: CPT | Mod: LT

## 2024-11-11 PROCEDURE — 99213 OFFICE O/P EST LOW 20 MIN: CPT | Mod: 25

## 2024-12-03 ENCOUNTER — APPOINTMENT (OUTPATIENT)
Dept: ORTHOPEDIC SURGERY | Facility: CLINIC | Age: 79
End: 2024-12-03
Payer: MEDICARE

## 2024-12-03 VITALS — WEIGHT: 155 LBS | HEIGHT: 64 IN | BODY MASS INDEX: 26.46 KG/M2

## 2024-12-03 DIAGNOSIS — M17.12 UNILATERAL PRIMARY OSTEOARTHRITIS, LEFT KNEE: ICD-10-CM

## 2024-12-03 PROCEDURE — 99213 OFFICE O/P EST LOW 20 MIN: CPT

## 2024-12-23 ENCOUNTER — APPOINTMENT (OUTPATIENT)
Dept: CARDIOLOGY | Facility: CLINIC | Age: 79
End: 2024-12-23

## 2024-12-28 ENCOUNTER — APPOINTMENT (OUTPATIENT)
Dept: CARDIOLOGY | Facility: CLINIC | Age: 79
End: 2024-12-28

## 2024-12-31 ENCOUNTER — APPOINTMENT (OUTPATIENT)
Dept: CARDIOLOGY | Facility: CLINIC | Age: 79
End: 2024-12-31
Payer: MEDICARE

## 2024-12-31 PROCEDURE — 93880 EXTRACRANIAL BILAT STUDY: CPT

## 2024-12-31 PROCEDURE — 93306 TTE W/DOPPLER COMPLETE: CPT

## 2025-04-28 ENCOUNTER — APPOINTMENT (OUTPATIENT)
Dept: ORTHOPEDIC SURGERY | Facility: CLINIC | Age: 80
End: 2025-04-28

## 2025-06-23 ENCOUNTER — APPOINTMENT (OUTPATIENT)
Dept: ORTHOPEDIC SURGERY | Facility: CLINIC | Age: 80
End: 2025-06-23